# Patient Record
Sex: FEMALE | Race: WHITE | Employment: OTHER | URBAN - METROPOLITAN AREA
[De-identification: names, ages, dates, MRNs, and addresses within clinical notes are randomized per-mention and may not be internally consistent; named-entity substitution may affect disease eponyms.]

---

## 2017-09-19 NOTE — PATIENT DISCUSSION
Pt educated on Custom Toric: OS: [email or web address]. Pt educated that they will need glasses for all near and intermediate focal points after surgery. Pt educated that Toric lenses recommended due to amount of astigmatism. Pt educated on chance of needing rotation after surgery.

## 2017-09-19 NOTE — PATIENT DISCUSSION
Pt educated on Custom: OS: [email or web address]. Pt educated that they will need glasses for all near and intermediate focal points after surgery.

## 2021-05-05 ENCOUNTER — NEW PATIENT EMERGENCY (OUTPATIENT)
Dept: URBAN - METROPOLITAN AREA CLINIC 36 | Facility: CLINIC | Age: 71
End: 2021-05-05

## 2021-05-05 DIAGNOSIS — H43.391: ICD-10-CM

## 2021-05-05 DIAGNOSIS — H43.811: ICD-10-CM

## 2021-05-05 PROCEDURE — 92004 COMPRE OPH EXAM NEW PT 1/>: CPT

## 2021-05-05 ASSESSMENT — TONOMETRY
OD_IOP_MMHG: 16
OS_IOP_MMHG: 17

## 2021-05-05 ASSESSMENT — VISUAL ACUITY
OS_PH: 20/25
OS_SC: 20/70
OD_SC: 20/25-1